# Patient Record
Sex: MALE | Race: WHITE | URBAN - METROPOLITAN AREA
[De-identification: names, ages, dates, MRNs, and addresses within clinical notes are randomized per-mention and may not be internally consistent; named-entity substitution may affect disease eponyms.]

---

## 2017-06-14 ENCOUNTER — IMPORTED ENCOUNTER (OUTPATIENT)
Dept: URBAN - METROPOLITAN AREA CLINIC 50 | Facility: CLINIC | Age: 72
End: 2017-06-14

## 2017-09-14 ENCOUNTER — IMPORTED ENCOUNTER (OUTPATIENT)
Dept: URBAN - METROPOLITAN AREA CLINIC 50 | Facility: CLINIC | Age: 72
End: 2017-09-14

## 2017-09-27 ENCOUNTER — IMPORTED ENCOUNTER (OUTPATIENT)
Dept: URBAN - METROPOLITAN AREA CLINIC 50 | Facility: CLINIC | Age: 72
End: 2017-09-27

## 2017-10-13 ENCOUNTER — IMPORTED ENCOUNTER (OUTPATIENT)
Dept: URBAN - METROPOLITAN AREA CLINIC 50 | Facility: CLINIC | Age: 72
End: 2017-10-13

## 2017-11-03 ENCOUNTER — IMPORTED ENCOUNTER (OUTPATIENT)
Dept: URBAN - METROPOLITAN AREA CLINIC 50 | Facility: CLINIC | Age: 72
End: 2017-11-03

## 2017-11-16 ENCOUNTER — IMPORTED ENCOUNTER (OUTPATIENT)
Dept: URBAN - METROPOLITAN AREA CLINIC 50 | Facility: CLINIC | Age: 72
End: 2017-11-16

## 2018-06-26 ENCOUNTER — IMPORTED ENCOUNTER (OUTPATIENT)
Dept: URBAN - METROPOLITAN AREA CLINIC 50 | Facility: CLINIC | Age: 73
End: 2018-06-26

## 2018-12-18 ENCOUNTER — IMPORTED ENCOUNTER (OUTPATIENT)
Dept: URBAN - METROPOLITAN AREA CLINIC 50 | Facility: CLINIC | Age: 73
End: 2018-12-18

## 2019-01-15 ENCOUNTER — IMPORTED ENCOUNTER (OUTPATIENT)
Dept: URBAN - METROPOLITAN AREA CLINIC 50 | Facility: CLINIC | Age: 74
End: 2019-01-15

## 2019-01-29 ENCOUNTER — IMPORTED ENCOUNTER (OUTPATIENT)
Dept: URBAN - METROPOLITAN AREA CLINIC 50 | Facility: CLINIC | Age: 74
End: 2019-01-29

## 2019-02-25 NOTE — PATIENT DISCUSSION
I've asked the patient to follow up with their [address] [place] [address] within the next day or two to evaluate for VASCULAR RISK FACTORS FOR A STROKE and to CONSIDER ANTIPLATELET THERAPY or other anticoagulation.

## 2020-01-28 ENCOUNTER — IMPORTED ENCOUNTER (OUTPATIENT)
Dept: URBAN - METROPOLITAN AREA CLINIC 50 | Facility: CLINIC | Age: 75
End: 2020-01-28

## 2021-02-11 ENCOUNTER — IMPORTED ENCOUNTER (OUTPATIENT)
Dept: URBAN - METROPOLITAN AREA CLINIC 50 | Facility: CLINIC | Age: 76
End: 2021-02-11

## 2021-04-18 ASSESSMENT — VISUAL ACUITY
OS_SC: 20/25
OD_SC: 20/40
OD_SC: 20/25
OS_SC: 20/40+
OD_PH: 20/30
OD_PH: 20/30
OS_CC: J1@ 18 IN
OS_SC: 20/40-
OS_BAT: 20/100
OD_SC: 20/30-
OD_CC: J1@ 18 IN
OS_PH: 20/30-
OS_SC: 20/40+1
OD_BAT: 20/40
OS_SC: 20/40
OS_SC: 20/30
OD_SC: 20/30
OD_SC: 20/40
OS_OTHER: 20/40. 20/40.
OS_SC: 20/25
OD_SC: 20/40+
OD_SC: 20/40
OS_CC: J1+-2
OD_OTHER: 20/40. 20/50.
OS_SC: 20/30
OS_BAT: 20/40
OD_CC: J1+
OS_SC: 20/30
OD_SC: 20/30
OD_SC: 20/40+
OD_SC: 20/30-
OD_CC: J3
OS_CC: J1+
OS_SC: 20/25-
OS_SC: 20/25
OD_CC: J1+-2
OS_SC: 20/40+
OS_OTHER: 20/100. 20/200.
OD_SC: 20/30
OS_CC: J3
OD_SC: 20/40+

## 2021-04-18 ASSESSMENT — TONOMETRY
OD_IOP_MMHG: 17
OS_IOP_MMHG: 18
OS_IOP_MMHG: 18
OD_IOP_MMHG: 18
OD_IOP_MMHG: 19
OS_IOP_MMHG: 18
OS_IOP_MMHG: 18
OS_IOP_MMHG: 19
OD_IOP_MMHG: 16
OS_IOP_MMHG: 16
OD_IOP_MMHG: 20
OS_IOP_MMHG: 19
OD_IOP_MMHG: 17
OD_IOP_MMHG: 20
OD_IOP_MMHG: 19
OS_IOP_MMHG: 18
OS_IOP_MMHG: 18
OS_IOP_MMHG: 17
OD_IOP_MMHG: 18
OS_IOP_MMHG: 17
OS_IOP_MMHG: 17
OD_IOP_MMHG: 17
OD_IOP_MMHG: 18
OD_IOP_MMHG: 18
OS_IOP_MMHG: 19

## 2022-03-14 ENCOUNTER — PREPPED CHART (OUTPATIENT)
Dept: URBAN - METROPOLITAN AREA CLINIC 49 | Facility: CLINIC | Age: 77
End: 2022-03-14

## 2022-03-17 ENCOUNTER — COMPREHENSIVE EXAM (OUTPATIENT)
Dept: URBAN - METROPOLITAN AREA CLINIC 49 | Facility: CLINIC | Age: 77
End: 2022-03-17

## 2022-03-17 DIAGNOSIS — H43.813: ICD-10-CM

## 2022-03-17 DIAGNOSIS — E11.9: ICD-10-CM

## 2022-03-17 DIAGNOSIS — H43.21: ICD-10-CM

## 2022-03-17 DIAGNOSIS — H35.373: ICD-10-CM

## 2022-03-17 PROCEDURE — 92134 CPTRZ OPH DX IMG PST SGM RTA: CPT

## 2022-03-17 PROCEDURE — 92014 COMPRE OPH EXAM EST PT 1/>: CPT

## 2022-03-17 ASSESSMENT — VISUAL ACUITY
OU_SC: J1+/-2
OD_SC: 20/40-1
OS_SC: 20/25-2

## 2022-03-17 ASSESSMENT — TONOMETRY
OD_IOP_MMHG: 18
OS_IOP_MMHG: 18